# Patient Record
(demographics unavailable — no encounter records)

---

## 2025-06-13 NOTE — REASON FOR VISIT
[CV Risk Factors and Non-Cardiac Disease] : CV risk factors and non-cardiac disease [Arrhythmia/ECG Abnorrmalities] : arrhythmia/ECG abnormalities [Follow-Up - Clinic] : a clinic follow-up of [Abnormal ECG] : an abnormal ECG [Coronary Artery Disease] : coronary artery disease [Hyperlipidemia] : hyperlipidemia [Hypertension] : hypertension [Mitral Regurgitation] : mitral regurgitation [Peripheral Vascular Disease] : peripheral vascular disease [FreeTextEntry1] : s/p bilateral iliofemoral graft [facility] (2012),

## 2025-06-13 NOTE — PHYSICAL EXAM
[Well Developed] : well developed [Well Nourished] : well nourished [No Acute Distress] : no acute distress [Normal Venous Pressure] : normal venous pressure [No Carotid Bruit] : no carotid bruit [Normal S1, S2] : normal S1, S2 [No Rub] : no rub [Clear Lung Fields] : clear lung fields [Good Air Entry] : good air entry [No Respiratory Distress] : no respiratory distress  [Soft] : abdomen soft [Non Tender] : non-tender [No Masses/organomegaly] : no masses/organomegaly [Normal Bowel Sounds] : normal bowel sounds [Normal Gait] : normal gait [No Edema] : no edema [No Cyanosis] : no cyanosis [No Clubbing] : no clubbing [No Varicosities] : no varicosities [No Rash] : no rash [No Skin Lesions] : no skin lesions [Moves all extremities] : moves all extremities [No Focal Deficits] : no focal deficits [Normal Speech] : normal speech [Alert and Oriented] : alert and oriented [Normal memory] : normal memory [General Appearance - Well Developed] : well developed [Normal Appearance] : normal appearance [General Appearance - Well Nourished] : well nourished [Normal Conjunctiva] : the conjunctiva exhibited no abnormalities [Normal Oral Mucosa] : normal oral mucosa [Normal Jugular Venous A Waves Present] : normal jugular venous A waves present [Normal Jugular Venous V Waves Present] : normal jugular venous V waves present [No Jugular Venous Kaiser A Waves] : no jugular venous kaiser A waves [Respiration, Rhythm And Depth] : normal respiratory rhythm and effort [Exaggerated Use Of Accessory Muscles For Inspiration] : no accessory muscle use [Auscultation Breath Sounds / Voice Sounds] : lungs were clear to auscultation bilaterally [Bowel Sounds] : normal bowel sounds [Abdomen Soft] : soft [Abnormal Walk] : normal gait [Nail Clubbing] : no clubbing of the fingernails [Cyanosis, Localized] : no localized cyanosis [Skin Color & Pigmentation] : normal skin color and pigmentation [Skin Turgor] : normal skin turgor [] : no rash [Oriented To Time, Place, And Person] : oriented to person, place, and time [Affect] : the affect was normal [Mood] : the mood was normal [No Anxiety] : not feeling anxious [5th Left ICS - MCL] : palpated at the 5th LICS in the midclavicular line [Normal] : normal [No Precordial Heave] : no precordial heave was noted [Normal Rate] : normal [Rhythm Regular] : regular [Normal S1] : normal S1 [Normal S2] : normal S2 [No Gallop] : no gallop heard [II] : a grade 2 [I] : a grade 1 [2+] : left 2+ [No Abnormalities] : the abdominal aorta was not enlarged and no bruit was heard [No Pitting Edema] : no pitting edema present [S3] : no S3 [S4] : no S4 [Click] : no click [Pericardial Rub] : no pericardial rub [Right Carotid Bruit] : no bruit heard over the right carotid [Left Carotid Bruit] : no bruit heard over the left carotid

## 2025-06-13 NOTE — DISCUSSION/SUMMARY
[FreeTextEntry1] : Dr. Larios-(PRIOR VISIT and PMH WITH Dr. Larios): This is an 86-year-old male with past medical history significant for hypertension, peripheral vascular disease, peripheral neuropathy hypothyroidism, first-degree atrioventricular block, status post aortic stent graft with bilateral renal artery chimney procedure, crossover femoral bypass grafting from the left common femoral artery to right common femoral artery status post recent lithotripsy who comes in for cardiac follow up evaluation. He denies chest pain, shortness of breath, dizziness, palpitations or syncope. Electrocardiogram done August 12, 2024 demonstrates sinus bradycardia rate of 59 bpm is otherwise remarkable for poor R wave progression, left axis deviation, first-degree atrioventricular block. The patient episode of dizziness and lightheadedness in the morning of August 10, 2024 which he says was relieved with taking some orange juice and some food.  I have explained to the patient that he may have been hypoglycemic. I have also asked him to reduce his torsemide dose to 5 mg daily in the afternoon from 5 mg twice a day.  He will monitor the situation.  He does take his blood pressure on a regular basis.  Electrocardiogram done September 22, 2023 demonstrates sinus bradycardia rate of 50 bpm is otherwise remarkable for left axis deviation, left anterior hemiblock, first-degree atrioventricular block and poor R wave progression. The patient will have new blood work done today for electrolytes and lipid profile. Lipid panel done March 24, 2023 demonstrated cholesterol 131, HDL 62, triglycerides 100, LDL direct of 56, non-HDL cholesterol 69 mg/dL and hemoglobin A1c of 5.8. Echo Doppler examination done May 31, 2022 demonstrated normal ejection fraction of 65% with minimal mitral valve regurgitation, mild tricuspid valve regurgitation, mild pulmonic valve regurgitation, minimal aortic valve regurgitation with thickened aortic valve leaflets. Electrocardiogram done March 24, 2023 demonstrates sinus bradycardia rate of 55 bpm is otherwise remarkable for poor R wave progression, left atrial abnormality, first-degree atrioventricular block and left axis deviation. The patient's blood pressure is under good control.  He will be seeing Dr. Ruff of nephrology next month. He will have new blood work done today for lipid panel and electrolytes.  Electrocardiogram done November 29, 2022 demonstrates sinus bradycardia rate of 54 bpm is otherwise remarkable for first-degree atrioventricular block, and left axis deviation.  There is poor R wave progression. He is currently hemodynamically stable and asymptomatic from a cardiac standpoint.  He had a normal sleep study Echo Doppler examination done November 13, 2020 demonstrated mild mitral valve regurgitation, mild tricuspid valve regurgitation, mild pulmonic valve regurgitation, normal left ventricular function with an estimated ejection fraction of 64%.  PMH: Blood work done October 19, 2021 demonstrated direct LDL of 44 mg/dL, non-HDL cholesterol 62 mg/dL, total cholesterol 121 mg/dL, HDL of 59 mg/dL and triglycerides of 107 mg/dL. Blood work done 10/19/2021 demonstrated potassium 4.6, cholesterol of 121, HDL 59, triglycerides 107, direct LDL cholesterol 44 mg/dL, non-HDL cholesterol 62 mg/dL and hemoglobin A1c of 5.8.  PMH: (He was taken off labetalol, clonidine, and Aldactone during his hospitalization).  Electrocardiogram done 10/19/2021 demonstrated normal sinus rhythm at a rate of 66 bpm is otherwise remarkable for left anterior hemiblock, poor R wave progression and first-degree atrioventricular block.  PMH: The patient had a recent hospitalization for near syncope and his blood pressure medications were adjusted.  He was taken off clonidine, Aldactone, and labetalol.  Electrocardiogram done June 29, 2021 demonstrates sinus bradycardia rate of 58 bpm is otherwise remarkable for left axis deviation, left atrial abnormality and first-degree atrioventricular block. He had discontinued aspirin and will continue Plavix.   The patient understands that aerobic exercises must be increased to 40 minutes 4 times per week. A detailed discussion of lifestyle modification was done today. The patient has a good understanding of the diagnosis, and treatment plan. Lifestyle modification was also outlined.

## 2025-06-13 NOTE — ASSESSMENT
[FreeTextEntry1] : This is an 87-year-old male with a past medical history significant for hypertension, CAD, peripheral vascular disease, COPD, peripheral neuropathy, hypothyroidism, hyperlipidemia, first-degree atrioventricular block, status post aortic stent graft with bilateral renal artery chimney procedure, crossover femoral bypass grafting from the left common femoral artery to right common femoral artery status post recent lithotripsy.  Cardiac risk factors include HTN, HLD.   HPI: He is feeling generally well today and denies chest pain, dizziness, heart palpitations, recent episodes of syncope or falls, SOB, or dyspnea at this time.  Current Medications: Amlodipine 10 mg daily, Aspirin 81 mg daily, Ezetimibe 10 mg daily, Plavix 75 mg daily, Fish oil 1000 mg he takes 1 capsule twice daily, Rosuvastatin 10 mg daily, Telmisartan 80 mg daily, and Torsemide 5 mg daily.   He is following up closely with his nephrologist Dr. Ruff in regard to his blood pressure management.    He does use the assistance of a walker to ambulate, and he has his bilateral leg braces in place. He's been doing PT for the past 17 months twice weekly.    BLOOD PRESSURE: Stable    BLOOD WORK:   *LDL target goal < 70* -New blood work was done 06/13/2025 to evaluate lipid profile, CBC, BMP, hepatic function, A1C and TSH. - Lipid panel done in August 2024 demonstrated triglycerides 161, cholesterol 115, HDL 60, LDL 28, non-HDL 54, LDL direct 31.   TESTING/REPORTS: -Echocardiogram done today 06/13/2025 in office with results pending.   -EKG done 06/13/2025 demonstrated sinus bradycardia rate 48 bpm otherwise remarkable for first degree AV block and left axis deviation.   -Electrocardiogram done August 12, 2024 demonstrates sinus bradycardia rate of 59 bpm is otherwise remarkable for poor R wave progression, left axis deviation, first-degree atrioventricular block.  -Electrocardiogram done September 22, 2023 demonstrates sinus bradycardia rate of 50 bpm is otherwise remarkable for left axis deviation, left anterior hemiblock, first-degree atrioventricular block and poor R wave progression.  -EKG done 08/29/2022 which demonstrated regular sinus rhythm with nonspecific ST-T wave changes BPM of 55 1st degree AV block.   -Echocardiogram done May 31, 2022 demonstrated minimal mitral regurgitation, mildly thickened and calcified tri-leaflet aortic valve with normal opening, minimal aortic regurgitation, mild tricuspid regurgitation, mild pulmonic regurgitation consistent with borderline pulmonary pressures, normal left ventricular systolic function with ejection fraction of 65%.  -EKG done  03/21/2022 which demonstrated regular sinus rhythm with nonspecific ST-T wave changes BPM of 60 with 1st degree AV block. This is his baseline EKG.  -EKG done May 26, 2021 which demonstrated regular sinus rhythm with nonspecific ST-T wave changes, BPM of 62 with 1st degree AV block.  -Sleep study performed on July 2, 2021 to evaluated for sleep apnea etiology for resistant hypertension demonstrated an AHI of 4.   -The patient and ambulatory blood pressure monitor done March 15, 2021 which demonstrated a daytime blood pressure 142/75, and a nighttime blood pressure 122/62 consistent with stage I hypertension during the day. -24 hour BP monitor done on 3/15/2021 demonstrated Stage 1 HTN.  -The patient had an echocardiogram done on 11/13/2020 which demonstrated mild mitral tricuspid and pulmonic regurgitation, thickened aortic valve, mild aortic regurgitation with normal left ventricular systolic function.  -EF on echo reported to be 62%.  -He had a normal nuclear stress test done May 14, 2018.   PLAN: -He will continue with his current medications and will contact the office if he is having any complaints between now and their next follow up appointment.   I have discussed the plan of care with ROLANDA FOSTER and he will follow up in 3-4 months. They are compliant with all of their medications.     The patient understands that aerobic exercises must be increased to 40 minutes 4 times/week and a detailed discussion of lifestyle modification was done today.   The patient has a good understanding of the diagnosis, treatment plan and lifestyle modification.   They will contact me at the office for any questions with their care or any changes in their health status.   The patient was discussed with supervision physician Dr. John Larios at the time of the visit and the plan of care will be carried out as noted above.     KAYLYNN Pfeiffer NP

## 2025-06-13 NOTE — CARDIOLOGY SUMMARY
[___] : [unfilled] [de-identified] : 3/21/22 [de-identified] : 24 hour BP Cuff: 3/15/2021 [de-identified] : [facility] 5/17/2018 [de-identified] : 11/13/2020